# Patient Record
(demographics unavailable — no encounter records)

---

## 2025-04-22 NOTE — HISTORY OF PRESENT ILLNESS
[FreeTextEntry1] :  Date of injury: 3/29/2025 (3 weeks and 3 days) Patient returns for follow up visit of nondisplaced left scaphoid tubercle fracture. She reports that Thursday she was in so much pain.

## 2025-04-22 NOTE — PHYSICAL EXAM
[de-identified] : There continues to be swelling ecchymosis and tenderness localized over the distal pole of the scaphoid but there is also tenderness over the left thumb CMC joint.  There is no evidence of crepitus or instability of the region negative Finkelstein test no tenderness over the proximal pole the scaphoid scapholunate or lunotriquetral TFCC.  No tenderness over the MP or IP joint and no evidence of joint instability of the MP or IP joint.  The CMC joint is too uncomfortable to be fully stressed.  Good capillary refill negative Tinel's sensation grossly intact. [de-identified] : PA lateral and oblique shows no change in alignment of the proximal pole scaphoid fracture.  There is degenerative changes over the left thumb CMC joint which is without changes compared to previous x-ray.  There is subluxation of the left thumb CMC joint appreciated on lateral

## 2025-04-22 NOTE — ASSESSMENT
[FreeTextEntry1] : 3 weeks 3 days status post left scaphoid distal pole fracture with persistence symptoms which are better localizing to the thumb CMC joint without evidence of clinical instability.  Differential diagnosis would include stretching of the ligaments versus acute injury.  I would like to proceed with a CT scan to evaluate the scaphoid as well as thumb CMC joint and based on the results a definitive plan will be developed.  She will continue splinting until CT scan obtained.

## 2025-05-12 NOTE — PHYSICAL EXAM
[de-identified] : Physical exam shows the patient to be alert and oriented x3, capable of ambulation. The patient is well-developed and well-nourished in no apparent respiratory distress. Majority of the skin is intact bilaterally in the upper extremities without lymphadenopathy at the elbows.  There is significant swelling and shininess to the skin of the left upper extremity.  No evidence of active infection.  There is diffuse tenderness around the entire hand not localizing to any specific structure.  The CMC joint and MP joint have similar tenderness and there is no instability of the MP radial ulnar collateral ligament.  Upon making a fist the digits are 2 cm from the palm with active equaling passive range of motion  Good capillary refill negative Tinel sensation grossly intact. [de-identified] : PA lateral on today's visit shows no change in alignment as compared to previous x-rays.

## 2025-05-12 NOTE — HISTORY OF PRESENT ILLNESS
[Right] : right hand dominant [FreeTextEntry1] : DOI-3/29/25 6 weeks 2 days status post closed left scaphoid distal pole fracture.  Patient continues to wear a splint on the left wrist.  Patient has been experiencing left hand redness for the past week which lasts throughout the day.  There is pain and swelling and temperature changes associated with the redness.

## 2025-05-12 NOTE — ASSESSMENT
[FreeTextEntry1] : 6 weeks status post left upper extremity CRPS.  The risks, benefits, alternatives and associated differential diagnosis was discussed with the patient. Options ranged from conservative care, therapy to surgical intervention were reviewed and all questions answered. Risks included incomplete resolution of symptoms, worsening of symptoms recurrence, tissue loss, functional loss and other risks associated with treatment of this condition Patient appeared to have an excellent understanding of the risks as well as differential diagnosis associated with this condition.  Elected to proceed with a Medrol Dosepak.  The name of a pain management doctor was also provided for consult and potential treatment with more aggressive forms of treatment.  They could also deal with the Ambien issues.  Return to the office in 3 weeks to assess the degree of success of this treatment plan.

## 2025-05-30 NOTE — PHYSICAL EXAM
[de-identified] : Physical exam shows the patient to be alert and oriented x3, capable of ambulation. The patient is well-developed and well-nourished in no apparent respiratory distress. Majority of the skin is intact bilaterally in the upper extremities without lymphadenopathy at the elbows.  Reduction of swelling of the left hand but the skin is still shiny.  There is tenderness over the left thumb CMC joint with a positive grind test negative Finkelstein test no tenderness over the MP or IP joint.  No tenderness over the scaphoid scapholunate or lunotriquetral ligament.  Pronation/supination 80/80 symmetric bilaterally Flexion/extension 80/60 on the right 70/50 on the left Upon making a fist the digits touch the palm on the left hand with active equaling passive range of motion.  No tenderness over the A1 pulley.   strength is 40 pounds on the right left Right-hand-dominant Positive Tinel's negative Phalen's left wrist  Sensation grossly intact

## 2025-05-30 NOTE — ASSESSMENT
[FreeTextEntry1] : Patient has made significant improvement with the gabapentin and recently had a sympathetic nerve block.  Options were discussed ranging from conservative management to more aggressive forms of treatment such as treating the carpal tunnel with either injection or surgical decompression.  Risk associate with each option also discussed including aggravating the CRPS, worsening of the symptom nerve damage tissue loss functional loss and other issues associated with anesthesia surgery versus conservative care.  Since she is improving with the gabapentin and recent nerve block and therapy this will be continued.  She will be reassessed in 6 to 8 weeks and if significant progress is not noted more aggressive forms

## 2025-05-30 NOTE — HISTORY OF PRESENT ILLNESS
[Right] : right hand dominant [FreeTextEntry1] : DOI 3/29/25  Pt presents for follow up closed left scaphoid distal pole fracture, approx 2 months out from injury. At last office visit on 5/12/25, pt elected for medrol dosepak. Pt states that the medrol seemed to help, but that the condition would worsen directly after coming off of it.   She received a sympathetic nerve block yesterday.  She is also started on gabapentin 300 mg extended release  She attends therapy twice a week and finds it beneficial